# Patient Record
Sex: FEMALE | Race: WHITE | NOT HISPANIC OR LATINO | Employment: FULL TIME | URBAN - METROPOLITAN AREA
[De-identification: names, ages, dates, MRNs, and addresses within clinical notes are randomized per-mention and may not be internally consistent; named-entity substitution may affect disease eponyms.]

---

## 2024-05-28 ENCOUNTER — OFFICE VISIT (OUTPATIENT)
Dept: OBSTETRICS AND GYNECOLOGY | Facility: CLINIC | Age: 29
End: 2024-05-28
Payer: COMMERCIAL

## 2024-05-28 VITALS
DIASTOLIC BLOOD PRESSURE: 82 MMHG | WEIGHT: 168 LBS | HEIGHT: 66 IN | SYSTOLIC BLOOD PRESSURE: 118 MMHG | BODY MASS INDEX: 27 KG/M2

## 2024-05-28 DIAGNOSIS — Z12.4 ROUTINE CERVICAL SMEAR: ICD-10-CM

## 2024-05-28 DIAGNOSIS — Z01.419 WELL WOMAN EXAM WITH ROUTINE GYNECOLOGICAL EXAM: Primary | ICD-10-CM

## 2024-05-28 DIAGNOSIS — Z11.3 SCREENING EXAMINATION FOR STD (SEXUALLY TRANSMITTED DISEASE): ICD-10-CM

## 2024-05-28 PROCEDURE — 99385 PREV VISIT NEW AGE 18-39: CPT | Performed by: OBSTETRICS & GYNECOLOGY

## 2024-05-28 PROCEDURE — 87491 CHLMYD TRACH DNA AMP PROBE: CPT

## 2024-05-28 PROCEDURE — 87591 N.GONORRHOEAE DNA AMP PROB: CPT

## 2024-05-28 PROCEDURE — 1036F TOBACCO NON-USER: CPT | Performed by: OBSTETRICS & GYNECOLOGY

## 2024-05-28 PROCEDURE — 88175 CYTOPATH C/V AUTO FLUID REDO: CPT

## 2024-05-28 RX ORDER — DEXTROAMPHETAMINE SACCHARATE, AMPHETAMINE ASPARTATE, DEXTROAMPHETAMINE SULFATE AND AMPHETAMINE SULFATE 2.5; 2.5; 2.5; 2.5 MG/1; MG/1; MG/1; MG/1
1 TABLET ORAL 3 TIMES DAILY
COMMUNITY

## 2024-05-28 RX ORDER — FLUOXETINE 10 MG/1
10 CAPSULE ORAL DAILY
COMMUNITY
Start: 2023-11-07

## 2024-05-28 NOTE — PROGRESS NOTES
"Subjective   Kelly Bernardo is a 29 y.o. female who is here for a routine well woman exam.     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    She was last seen by Dr. Koroma in July 2020.   She had a Mirena IUD removal/reinsertion done at that time.    She still has the IUD. Doing well.  Does not get menstrual cycles.    Since her last visit she has not had any new medical changes.    She is single. More than one partner in the last year. Uses condoms.  Agrees to STD screening today.    No breast concerns.    Family history of uterine or ovarian cancer: no  Family history of breast cancer: no    ROS:  Constitutional: Negative  Eyes: Negative  HEENT: Negative  Respiratory: Negative  Cardiovascular: Negative  GI: Negative  : Negative  Endocrine: Negative  Breast: Negative  Musculoskeletal: Negative  Skin: Negative  Hematologic: Negative  Neurologic: Negative  Psychiatric: Negative     Objective   /82   Ht 1.676 m (5' 6\")   Wt 76.2 kg (168 lb)   BMI 27.12 kg/m²     Constitutional: Alert and in no acute distress.   Pulmonary: No respiratory distress.   Chest: Breasts: Normal appearance, no nipple discharge, and no skin changes. Palpation of breasts and axillae: No palpable mass and no axillary lymphadenopathy.  Abdomen: Soft, nontender; no abdominal mass palpated.   Genitourinary:   External genitalia: Normal appearance.  No inguinal lymphadenopathy.   Bartholin's, Urethral, and Skenes Glands: Normal.   Urethra: Normal. Bladder: Normal on palpation.   Vagina: Normal.   Cervix: Normal appearance, nontender. IUD strings are visible.  Uterus/Adnexa: Normal size, mobile uterus. Nontender, no masses palpated in adnexa  Inspection of perianal area: Normal.  Musculoskeletal: No joint swelling seen, normal movements of all extremities.  Skin: Normal skin color and pigmentation, normal skin turgor, and no " rash.  Psychiatric: Alert and oriented x 3. Affect normal to patient's baseline. Mood: Appropriate.       Assessment/Plan   1. Well woman exam with routine gynecological exam  2. Routine cervical smear  - THINPREP PAP TEST  3. Screening examination for STD (sexually transmitted disease)  - THINPREP PAP TEST    Breast and pelvic exam findings today were normal.  Pap testing was done today. STD screening also done. She will be notified of these test results.  She will continue use of the Mirena IUD for contraception. Not due for removal/replacement until 2028.  Advised her to call with any new problems or questions.  Follow up in 1 year for the next well woman visit.

## 2024-05-30 LAB
C TRACH RRNA SPEC QL NAA+PROBE: NEGATIVE
N GONORRHOEA DNA SPEC QL PROBE+SIG AMP: NEGATIVE

## 2024-06-07 LAB
CYTOLOGY CMNT CVX/VAG CYTO-IMP: NORMAL
LAB AP HPV GENOTYPE QUESTION: YES
LAB AP HPV HR: NORMAL
LAB AP PAP ADDITIONAL TESTS: NORMAL
LABORATORY COMMENT REPORT: NORMAL
PATH REPORT.TOTAL CANCER: NORMAL

## 2025-02-19 DIAGNOSIS — N89.8 VAGINAL ODOR: Primary | ICD-10-CM

## 2025-02-19 RX ORDER — METRONIDAZOLE 500 MG/1
500 TABLET ORAL 2 TIMES DAILY
Qty: 14 TABLET | Refills: 0 | Status: SHIPPED | OUTPATIENT
Start: 2025-02-19 | End: 2025-02-26